# Patient Record
(demographics unavailable — no encounter records)

---

## 2024-10-15 NOTE — ADDENDUM
[FreeTextEntry1] : I, Cristobal Walker, documented this note as a scribe on behalf of Dr. Ady Francis on 10/15/2024.

## 2024-10-15 NOTE — DISCUSSION/SUMMARY
[All Questions Answered] : Patient (and family) had all questions answered to an agreeable level of satisfaction [Interested in Proceeding] : Patient (and family) expressed understanding and interest in proceeding with the plan as outlined [de-identified] : Likely cartilage lesion in the base of the greater trochanter. I do not think this is the source of her pain and is relatively nonaggressive. I would follow this with continued X-rays. I can see her again in 3 months for repeat X-rays to assure stability, and to get X-rays of the left acetabulum at the same time. I have also recommended stretching exercises.  If imaging or pathology/biopsy was ordered, the patient was told to make an appointment to review findings right after all imaging is completed.   We discussed risks, benefits and alternatives. Rationale of care was reviewed and all questions were answered.

## 2024-10-15 NOTE — HISTORY OF PRESENT ILLNESS
[FreeTextEntry1] : This is a 42 year old female with a right greater trochanter lesion. She complains of right hip pain posteriorly along the gluteus pako insertion and a little laterally. She does not recall any inciting trauma. He pain has been present for the past few months. She takes Advil with moderate improvement of her pain. Yoga/stretching seems to make her pain worse.

## 2024-10-15 NOTE — PHYSICAL EXAM
[General Appearance - Well-Appearing] : Well appearing [General Appearance - Well Nourished] : well nourished [Oriented To Time, Place, And Person] : Oriented to person, place, and time [Sclera] : the sclera and conjunctiva were normal [Neck Cervical Mass (___cm)] : no neck mass was observed [Heart Rate And Rhythm] : heart rate was normal and rhythm regular [] : No respiratory distress [Abdomen Soft] : Soft [Normal Station and Gait] : gait and station were normal [FreeTextEntry1] : On exam she walks normally. She has some pain gluteally at the attachment of the proximal femur. She has good ROM otherwise. She has no tenderness on the left side with symmetric motion. She has no lymphadenopathy.

## 2024-10-15 NOTE — DATA REVIEWED
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 Mild edema within the ischiofemoral space, which can be seen in the setting of ischiofemoral impingement. Mild greater trochanteric bursitis. Nondisplaced tear of the anterior superior labrum. Right intertrochanteric enchondroma.   X-rays today (10/15/2024), AP pelvis view of the right hip, show some minimal degenerative changes with a calcified lesion at the base of the right greater trochanter, approx. 1.5-2 cm, with some punctate calcification consistent with benign cartilage lesion. There may be another small calcified lesion above the left acetabulum, similarly non-aggressive-appearing.

## 2024-10-15 NOTE — END OF VISIT
[FreeTextEntry3] : All medical record entries made by the Scribe were at my, Dr. Ady Francis, direction and personally dictated by me on 10/15/2024. I have reviewed the chart and agree that the record accurately reflects my personal performance of the history, physical exam, assessment and plan. I have also personally directed, reviewed, and agreed with the chart.

## 2025-01-14 NOTE — DATA REVIEWED
[Imaging Present] : Present [de-identified] : X-rays today (01/13/2025), AP pelvis and multiple views of the right hip, show the same partially calcified lesion at the base of the greater trochanter. Compared to 3 months ago, it is exactly the same. There is also something showing above the left acetabulum, similarly unchanged, likely cartilage.

## 2025-01-14 NOTE — DISCUSSION/SUMMARY
[All Questions Answered] : Patient (and family) had all questions answered to an agreeable level of satisfaction [Interested in Proceeding] : Patient (and family) expressed understanding and interest in proceeding with the plan as outlined [de-identified] : . Patient with a benign-appearing incidentally found likely cartilage lesion in the base of the greater trochanter, without change seen since last visit. She is cleared for regular activity. Follow up in 6 months for radiographic and clinical surveillance, sooner if any increase in pain.   If imaging or pathology/biopsy was ordered, the patient was told to make an appointment to review findings right after all imaging is completed.   We discussed risks, benefits and alternatives. Rationale of care was reviewed and all questions were answered.

## 2025-01-14 NOTE — DISCUSSION/SUMMARY
[All Questions Answered] : Patient (and family) had all questions answered to an agreeable level of satisfaction [Interested in Proceeding] : Patient (and family) expressed understanding and interest in proceeding with the plan as outlined [de-identified] : . Patient with a benign-appearing incidentally found likely cartilage lesion in the base of the greater trochanter, without change seen since last visit. She is cleared for regular activity. Follow up in 6 months for radiographic and clinical surveillance, sooner if any increase in pain.   If imaging or pathology/biopsy was ordered, the patient was told to make an appointment to review findings right after all imaging is completed.   We discussed risks, benefits and alternatives. Rationale of care was reviewed and all questions were answered.

## 2025-01-14 NOTE — ADDENDUM
[FreeTextEntry1] : I, Cristobal Walker, documented this note as a scribe on behalf of Dr. Ady Francis on 01/13/2025.

## 2025-01-14 NOTE — DATA REVIEWED
[Imaging Present] : Present [de-identified] : X-rays today (01/13/2025), AP pelvis and multiple views of the right hip, show the same partially calcified lesion at the base of the greater trochanter. Compared to 3 months ago, it is exactly the same. There is also something showing above the left acetabulum, similarly unchanged, likely cartilage.

## 2025-01-14 NOTE — DATA REVIEWED
[Imaging Present] : Present [de-identified] : X-rays today (01/13/2025), AP pelvis and multiple views of the right hip, show the same partially calcified lesion at the base of the greater trochanter. Compared to 3 months ago, it is exactly the same. There is also something showing above the left acetabulum, similarly unchanged, likely cartilage.

## 2025-01-14 NOTE — HISTORY OF PRESENT ILLNESS
[FreeTextEntry1] : Patient is still having some pain over the greater trochanter when she is sitting and with weightbearing, though this is improved from last visit. She is feeling well otherwise. [1] : currently ~his/her~ pain is 1 out of 10

## 2025-01-14 NOTE — DISCUSSION/SUMMARY
[All Questions Answered] : Patient (and family) had all questions answered to an agreeable level of satisfaction [Interested in Proceeding] : Patient (and family) expressed understanding and interest in proceeding with the plan as outlined [de-identified] : . Patient with a benign-appearing incidentally found likely cartilage lesion in the base of the greater trochanter, without change seen since last visit. She is cleared for regular activity. Follow up in 6 months for radiographic and clinical surveillance, sooner if any increase in pain.   If imaging or pathology/biopsy was ordered, the patient was told to make an appointment to review findings right after all imaging is completed.   We discussed risks, benefits and alternatives. Rationale of care was reviewed and all questions were answered.

## 2025-07-14 NOTE — DATA REVIEWED
[Imaging Present] : Present [de-identified] : X-rays today (07/14/2025), AP pelvis and views of the right hip, show the same calcified lesion in the greater trochanter. This is unchanged from initial imaging.

## 2025-07-14 NOTE — END OF VISIT
[FreeTextEntry3] : All medical record entries made by the Scribe were at my, Dr. Ady Francis, direction and personally dictated by me on 07/14/2025. I have reviewed the chart and agree that the record accurately reflects my personal performance of the history, physical exam, assessment and plan. I have also personally directed, reviewed, and agreed with the chart.

## 2025-07-14 NOTE — DISCUSSION/SUMMARY
Aspirin Registry Question:   Aspirin was given prior to the procedure.  324mg @ 7550 [All Questions Answered] : Patient (and family) had all questions answered to an agreeable level of satisfaction [Interested in Proceeding] : Patient (and family) expressed understanding and interest in proceeding with the plan as outlined [de-identified] : Patient with a benign-appearing incidentally found likely cartilage lesion in the base of the greater trochanter, without change seen since last visit. She is cleared for regular activity. Follow up in one year for radiographic and clinical surveillance, sooner if any increase in pain.   If imaging or pathology/biopsy was ordered, the patient was told to make an appointment to review findings right after all imaging is completed.   We discussed risks, benefits and alternatives. Rationale of care was reviewed and all questions were answered.

## 2025-07-14 NOTE — ADDENDUM
[FreeTextEntry1] : I, Cristobal Walker, documented this note as a scribe on behalf of Dr. Ady Francis on 07/14/2025.

## 2025-07-14 NOTE — HISTORY OF PRESENT ILLNESS
[1] : currently ~his/her~ pain is 1 out of 10 [FreeTextEntry1] : Patient has some mild pain over the right iliac crest but nothing over her greater trochanter. She is otherwise feeling well and is doing all her activites without issue.

## 2025-07-14 NOTE — PHYSICAL EXAM
[General Appearance - Well-Appearing] : Well appearing [General Appearance - Well Nourished] : well nourished [Oriented To Time, Place, And Person] : Oriented to person, place, and time [Sclera] : the sclera and conjunctiva were normal [Neck Cervical Mass (___cm)] : no neck mass was observed [Heart Rate And Rhythm] : heart rate was normal and rhythm regular [] : No respiratory distress [Abdomen Soft] : Soft [Normal Station and Gait] : gait and station were normal [FreeTextEntry1] : On exam she walks normally. She has some pain gluteally at the attachment of the proximal femur. She has good ROM otherwise. She has no tenderness on the left side with symmetric motion. She has no lymphadenopathy. Intermediate Repair Preamble Text (Leave Blank If You Do Not Want): Wide undermining was performed with blunt dissection.

## 2025-07-14 NOTE — DISCUSSION/SUMMARY
[All Questions Answered] : Patient (and family) had all questions answered to an agreeable level of satisfaction [Interested in Proceeding] : Patient (and family) expressed understanding and interest in proceeding with the plan as outlined [de-identified] : Patient with a benign-appearing incidentally found likely cartilage lesion in the base of the greater trochanter, without change seen since last visit. She is cleared for regular activity. Follow up in one year for radiographic and clinical surveillance, sooner if any increase in pain.   If imaging or pathology/biopsy was ordered, the patient was told to make an appointment to review findings right after all imaging is completed.   We discussed risks, benefits and alternatives. Rationale of care was reviewed and all questions were answered.

## 2025-07-14 NOTE — DATA REVIEWED
[Imaging Present] : Present [de-identified] : X-rays today (07/14/2025), AP pelvis and views of the right hip, show the same calcified lesion in the greater trochanter. This is unchanged from initial imaging.
